# Patient Record
Sex: MALE | URBAN - METROPOLITAN AREA
[De-identification: names, ages, dates, MRNs, and addresses within clinical notes are randomized per-mention and may not be internally consistent; named-entity substitution may affect disease eponyms.]

---

## 2018-10-29 ENCOUNTER — OFFICE VISIT (OUTPATIENT)
Dept: URBAN - METROPOLITAN AREA CLINIC 23 | Facility: CLINIC | Age: 83
End: 2018-10-29
Payer: COMMERCIAL

## 2018-10-29 DIAGNOSIS — H16.223 KERATOCONJUNCT SICCA, NOT SPECIFIED AS SJOGREN'S, BILATERAL: ICD-10-CM

## 2018-10-29 PROCEDURE — 92014 COMPRE OPH EXAM EST PT 1/>: CPT | Performed by: OPTOMETRIST

## 2018-10-29 ASSESSMENT — INTRAOCULAR PRESSURE
OD: 12
OS: 12

## 2018-10-29 ASSESSMENT — VISUAL ACUITY
OD: 20/60
OS: 20/40

## 2018-10-29 ASSESSMENT — KERATOMETRY
OD: 42.25
OS: 40.75

## 2018-10-29 NOTE — IMPRESSION/PLAN
Impression: Keratoconjunct sicca, not specified as Sjogren's, bilateral: W49.184. Plan: Discussed diagnosis in detail with patient. Reassured patient of dry eyes and treatment. Will continue to observe condition/symptoms. Patient instructed to use ATs QID OU and Genteal gel QHS OU. Patient to call if symptoms progress. Sample of ATs given today.

## 2018-10-29 NOTE — IMPRESSION/PLAN
Impression: Combined forms of age-related cataract, bilateral: H25.813. Plan: Cataracts account for the patient's complaints. Discussed diagnosis in detail with patient. Discussed all R/B's and procedures. Patient understands changing glasses prescription will not significantly improve vision. Patient elects to have surgery, phacoemulsification with intraocular lens recommended. Discussed lens options of Toric/Multifocal. Recommend standard IOL, target distance, RL2. Will refer to cataract surgeon for pre-operative evaluation. CEIOL OD>OS, aim plano with standard lens OU.

## 2018-11-13 ENCOUNTER — PRE-OPERATIVE VISIT (OUTPATIENT)
Dept: URBAN - METROPOLITAN AREA CLINIC 23 | Facility: CLINIC | Age: 83
End: 2018-11-13
Payer: COMMERCIAL

## 2018-11-13 DIAGNOSIS — H25.813 COMBINED FORMS OF AGE-RELATED CATARACT, BILATERAL: Primary | ICD-10-CM

## 2018-11-13 PROCEDURE — 92025 CPTRIZED CORNEAL TOPOGRAPHY: CPT | Performed by: OPHTHALMOLOGY

## 2018-11-13 ASSESSMENT — PACHYMETRY
OD: 26.63
OD: 3.34
OS: 3.28
OS: 26.92

## 2018-11-16 ENCOUNTER — OFFICE VISIT (OUTPATIENT)
Dept: URBAN - METROPOLITAN AREA CLINIC 23 | Facility: CLINIC | Age: 83
End: 2018-11-16
Payer: COMMERCIAL

## 2018-11-16 PROCEDURE — 99213 OFFICE O/P EST LOW 20 MIN: CPT | Performed by: OPHTHALMOLOGY

## 2018-11-16 RX ORDER — PREDNISOLONE ACETATE 10 MG/ML
1 % SUSPENSION/ DROPS OPHTHALMIC
Qty: 1 | Refills: 1 | Status: INACTIVE
Start: 2018-11-16 | End: 2018-11-16

## 2018-11-16 RX ORDER — OFLOXACIN 3 MG/ML
0.3 % SOLUTION/ DROPS OPHTHALMIC
Qty: 1 | Refills: 1 | Status: ACTIVE
Start: 2018-11-16

## 2018-11-16 RX ORDER — OFLOXACIN 3 MG/ML
0.3 % SOLUTION/ DROPS OPHTHALMIC
Qty: 1 | Refills: 1 | Status: INACTIVE
Start: 2018-11-16 | End: 2018-11-16

## 2018-11-16 RX ORDER — PREDNISOLONE ACETATE 10 MG/ML
1 % SUSPENSION/ DROPS OPHTHALMIC
Qty: 1 | Refills: 1 | Status: ACTIVE
Start: 2018-11-16

## 2018-11-16 ASSESSMENT — INTRAOCULAR PRESSURE
OD: 7
OS: 7

## 2018-11-16 NOTE — IMPRESSION/PLAN
Impression: Combined forms of age-related cataract, bilateral: H25.813. Vision: vision affected. Symptoms: could improve with surgery. Plan: Cataract accounts for patient's complaints. Reviewed risks, benefits, and procedure. Patient desires surgery, schedule ce/iol OD then OS, RL2, standard IOL, distance refractive target, patient is clear for surgery in [facility] [place] [facility]. ERx'd drops to pharmacy.

## 2018-11-20 ENCOUNTER — SURGERY (OUTPATIENT)
Dept: URBAN - METROPOLITAN AREA SURGERY 11 | Facility: SURGERY | Age: 83
End: 2018-11-20
Payer: COMMERCIAL

## 2018-11-20 PROCEDURE — 66984 XCAPSL CTRC RMVL W/O ECP: CPT | Performed by: OPHTHALMOLOGY

## 2018-11-21 ENCOUNTER — POST-OPERATIVE VISIT (OUTPATIENT)
Dept: URBAN - METROPOLITAN AREA CLINIC 23 | Facility: CLINIC | Age: 83
End: 2018-11-21

## 2018-11-21 DIAGNOSIS — Z09 ENCNTR FOR F/U EXAM AFT TRTMT FOR COND OTH THAN MALIG NEOPLM: Primary | ICD-10-CM

## 2018-11-21 PROCEDURE — 99024 POSTOP FOLLOW-UP VISIT: CPT | Performed by: OPTOMETRIST

## 2018-11-21 ASSESSMENT — INTRAOCULAR PRESSURE
OD: 7
OS: 7

## 2018-11-27 ENCOUNTER — POST-OPERATIVE VISIT (OUTPATIENT)
Dept: URBAN - METROPOLITAN AREA CLINIC 23 | Facility: CLINIC | Age: 83
End: 2018-11-27

## 2018-11-27 PROCEDURE — 99024 POSTOP FOLLOW-UP VISIT: CPT | Performed by: OPTOMETRIST

## 2018-11-27 ASSESSMENT — VISUAL ACUITY
OS: 20/40
OD: 20/20

## 2018-11-27 ASSESSMENT — INTRAOCULAR PRESSURE
OS: 9
OD: 8

## 2018-12-06 ENCOUNTER — SURGERY (OUTPATIENT)
Dept: URBAN - METROPOLITAN AREA SURGERY 11 | Facility: SURGERY | Age: 83
End: 2018-12-06
Payer: COMMERCIAL

## 2018-12-06 PROCEDURE — 66984 XCAPSL CTRC RMVL W/O ECP: CPT | Performed by: OPHTHALMOLOGY

## 2018-12-07 ENCOUNTER — POST-OPERATIVE VISIT (OUTPATIENT)
Dept: URBAN - METROPOLITAN AREA CLINIC 23 | Facility: CLINIC | Age: 83
End: 2018-12-07

## 2018-12-07 PROCEDURE — 99024 POSTOP FOLLOW-UP VISIT: CPT | Performed by: OPTOMETRIST

## 2018-12-07 ASSESSMENT — INTRAOCULAR PRESSURE
OS: 9
OD: 7

## 2018-12-18 ENCOUNTER — POST-OPERATIVE VISIT (OUTPATIENT)
Dept: URBAN - METROPOLITAN AREA CLINIC 23 | Facility: CLINIC | Age: 83
End: 2018-12-18

## 2018-12-18 PROCEDURE — 99024 POSTOP FOLLOW-UP VISIT: CPT | Performed by: OPTOMETRIST

## 2018-12-18 ASSESSMENT — INTRAOCULAR PRESSURE
OS: 8
OD: 9

## 2018-12-18 ASSESSMENT — VISUAL ACUITY
OS: 20/25+
OD: 20/20

## 2019-01-08 ENCOUNTER — POST-OPERATIVE VISIT (OUTPATIENT)
Dept: URBAN - METROPOLITAN AREA CLINIC 23 | Facility: CLINIC | Age: 84
End: 2019-01-08

## 2019-01-08 PROCEDURE — 99024 POSTOP FOLLOW-UP VISIT: CPT | Performed by: OPTOMETRIST

## 2019-01-08 ASSESSMENT — INTRAOCULAR PRESSURE
OS: 7
OD: 6